# Patient Record
Sex: MALE | Race: WHITE | NOT HISPANIC OR LATINO | URBAN - METROPOLITAN AREA
[De-identification: names, ages, dates, MRNs, and addresses within clinical notes are randomized per-mention and may not be internally consistent; named-entity substitution may affect disease eponyms.]

---

## 2018-08-15 ENCOUNTER — OFFICE VISIT (OUTPATIENT)
Dept: URBAN - METROPOLITAN AREA CLINIC 33 | Facility: CLINIC | Age: 73
End: 2018-08-15
Payer: MEDICARE

## 2018-08-15 PROCEDURE — 99204 OFFICE O/P NEW MOD 45 MIN: CPT | Performed by: OPTOMETRIST

## 2018-08-15 ASSESSMENT — INTRAOCULAR PRESSURE
OS: 12
OD: 12

## 2018-08-15 NOTE — IMPRESSION/PLAN
Impression: Vitreous degeneration, bilateral: H43.813. Plan: Posterior vitreous detachment accounts for the patient's complaints. There is no evidence of retinal pathology. All signs and risks of retinal detachment and tears were discussed in detail. Patient instructed to call the office immediately if any symptoms noted. Recommend the patient return to office for follow up w/ dilated examination. Discussed possibility for Vitrectomy in the future if condition becomes too bothersome.

## 2018-08-15 NOTE — IMPRESSION/PLAN
Impression: Type 2 diabetes mellitus w/o complication: R91.1. Plan: Diabetes w/o Complications: No signs of diabetic retinopathy noted. No treatment necessary at this time. Patient was instructed to monitor vision for sudden changes and to call if visual changes noted. Discussed ocular and systemic benefits of blood sugar control. Continue management with PCP. Letter sent to PCP regarding status of ocular health.

## 2019-09-17 ENCOUNTER — OFFICE VISIT (OUTPATIENT)
Dept: URBAN - METROPOLITAN AREA CLINIC 33 | Facility: CLINIC | Age: 74
End: 2019-09-17
Payer: MEDICARE

## 2019-09-17 DIAGNOSIS — H04.123 DRY EYE SYNDROME OF BILATERAL LACRIMAL GLANDS: ICD-10-CM

## 2019-09-17 DIAGNOSIS — E11.9 TYPE 2 DIABETES MELLITUS W/O COMPLICATION: Primary | ICD-10-CM

## 2019-09-17 PROCEDURE — 99214 OFFICE O/P EST MOD 30 MIN: CPT | Performed by: OPTOMETRIST

## 2019-09-17 ASSESSMENT — KERATOMETRY
OS: 42.38
OD: 42.14

## 2019-09-17 ASSESSMENT — INTRAOCULAR PRESSURE
OD: 16
OS: 16

## 2019-09-17 NOTE — IMPRESSION/PLAN
Impression: Type 2 diabetes mellitus w/o complication: F12.4. Plan: Diabetes w/o Complications: No signs of diabetic retinopathy noted. No treatment necessary at this time. Patient was instructed to monitor vision for sudden changes and to call if visual changes noted. Discussed ocular and systemic benefits of blood sugar control. Continue management with PCP. Letter sent to PCP regarding status of ocular health.

## 2021-06-25 ENCOUNTER — OFFICE VISIT (OUTPATIENT)
Dept: URBAN - METROPOLITAN AREA CLINIC 45 | Facility: CLINIC | Age: 76
End: 2021-06-25
Payer: MEDICARE

## 2021-06-25 DIAGNOSIS — H43.813 VITREOUS DEGENERATION, BILATERAL: ICD-10-CM

## 2021-06-25 DIAGNOSIS — Z96.1 PRESENCE OF INTRAOCULAR LENS: ICD-10-CM

## 2021-06-25 PROCEDURE — 99213 OFFICE O/P EST LOW 20 MIN: CPT | Performed by: OPTOMETRIST

## 2021-06-25 ASSESSMENT — INTRAOCULAR PRESSURE
OD: 12
OS: 14

## 2021-06-25 NOTE — IMPRESSION/PLAN
Impression: Type 2 diabetes mellitus w/o complication: A16.8. Plan: No Non-Proliferative Diabetic Retinopathy, no Diabetic Macular Edema and no Neovascularization of the iris, disc, or elsewhere. OCT performed and reviewed today, no sign of diabetic macular edema OU. Discussed ocular and systemic benefits of blood sugar control. Send notes to PCP. Check annually. RTC 1 year x CEE.

## 2021-06-25 NOTE — IMPRESSION/PLAN
Impression: Presence of intraocular lens: Z96.1. Plan: Condition is stable, no further treatment at this time. Discussed condition w/pt, identified as the cause of visual strain and other symptoms experienced. Pt advised that vision can be improved with glasses, deferred refraction today. RTC prn x refraction.

## 2023-02-22 NOTE — IMPRESSION/PLAN
Impression: Presence of intraocular lens: Z96.1. Plan: Patient educated about today's findings. Lens implant is clear and centered without need for treatment at this time. Patient to call office with any problems. Tele [phone] @ [phone]

## 2025-05-13 ENCOUNTER — APPOINTMENT (OUTPATIENT)
Dept: URBAN - METROPOLITAN AREA CLINIC 228 | Age: 80
Setting detail: DERMATOLOGY
End: 2025-05-13

## 2025-05-13 DIAGNOSIS — L57.8 OTHER SKIN CHANGES DUE TO CHRONIC EXPOSURE TO NONIONIZING RADIATION: ICD-10-CM

## 2025-05-13 DIAGNOSIS — L82.0 INFLAMED SEBORRHEIC KERATOSIS: ICD-10-CM

## 2025-05-13 PROCEDURE — OTHER MIPS QUALITY: OTHER

## 2025-05-13 PROCEDURE — OTHER COUNSELING: OTHER

## 2025-05-13 ASSESSMENT — LOCATION DETAILED DESCRIPTION DERM
LOCATION DETAILED: LEFT MEDIAL UPPER BACK
LOCATION DETAILED: LEFT INFERIOR CENTRAL MALAR CHEEK
LOCATION DETAILED: RIGHT DISTAL DORSAL FOREARM
LOCATION DETAILED: LEFT DISTAL DORSAL FOREARM

## 2025-05-13 ASSESSMENT — LOCATION ZONE DERM
LOCATION ZONE: TRUNK
LOCATION ZONE: ARM
LOCATION ZONE: FACE

## 2025-05-13 ASSESSMENT — LOCATION SIMPLE DESCRIPTION DERM
LOCATION SIMPLE: RIGHT FOREARM
LOCATION SIMPLE: LEFT UPPER BACK
LOCATION SIMPLE: LEFT FOREARM
LOCATION SIMPLE: LEFT CHEEK